# Patient Record
Sex: MALE | Race: WHITE
[De-identification: names, ages, dates, MRNs, and addresses within clinical notes are randomized per-mention and may not be internally consistent; named-entity substitution may affect disease eponyms.]

---

## 2018-11-21 ENCOUNTER — HOSPITAL ENCOUNTER (OUTPATIENT)
Dept: HOSPITAL 88 - CT | Age: 53
End: 2018-11-21
Attending: FAMILY MEDICINE
Payer: MEDICARE

## 2018-11-21 DIAGNOSIS — J18.9: Primary | ICD-10-CM

## 2018-11-21 PROCEDURE — 71250 CT THORAX DX C-: CPT

## 2018-11-21 NOTE — DIAGNOSTIC IMAGING REPORT
EXAMINATION: CT scan of the chest  without contrast.



TECHNIQUE: Helical CT images of the chest were performed from the lung apices

to the level of the adrenal glands.  No intravenous contrast was administered  

  Coronal and sagittal reformatted images were obtained.  Dose modulation,

iterative reconstruction, and/or weight based adjustment of the mA/kV was

utilized to reduce the radiation dose to as low as reasonably achievable. 



COMPARISON: None.



CLINICAL HISTORY:Pneumonia



DISCUSSION:   ABSENCE OF INTRAVENOUS CONTRAST DECREASES SENSITIVITY FOR

DETECTION OF FOCAL LESIONS AND VASCULAR PATHOLOGY.



LINES/TUBES:  None.



LUNGS AND AIRWAYS: Right upper lobe is elongated consolidation/scarring for

example on axial image 37 through 46 has decreased since the prior exam.



Consolidation along the right major fissure axial image 58 measures 1.9 cm,

decreased from 2.9 cm. Surrounding groundglass change resolved.



PLEURA: No pneumothorax or pleural effusions.  



HEART AND MEDIASTINUM: The thyroid gland is normal.   The heart and pericardium

are within normal limits.



LYMPH NODES: There is no mediastinal, hilar or axillary lymphadenopathy.



ABDOMEN: Limited contrast-enhanced views of the upper abdomen show no

abnormality within the visualized liver, spleen, pancreas, or kidneys. The

adrenal glands are normal.



BONES AND SOFT TISSUES: No acute bony abnormalities.



IMPRESSION: 



Decreased right upper and middle lobe consolidations likely reflects resolving

pneumonia.



Signed by: Dr. Andrew Palisch, M.D. on 11/21/2018 2:36 PM

## 2019-01-23 ENCOUNTER — HOSPITAL ENCOUNTER (OUTPATIENT)
Dept: HOSPITAL 88 - US | Age: 54
End: 2019-01-23
Attending: FAMILY MEDICINE
Payer: MEDICARE

## 2019-01-23 DIAGNOSIS — J18.9: Primary | ICD-10-CM

## 2019-01-23 DIAGNOSIS — R10.11: ICD-10-CM

## 2019-01-23 LAB
BUN SERPL-MCNC: 24 MG/DL (ref 7–26)
BUN/CREAT SERPL: 29 (ref 6–25)
EGFRCR SERPLBLD CKD-EPI 2021: > 60 ML/MIN (ref 60–?)

## 2019-01-23 PROCEDURE — 82565 ASSAY OF CREATININE: CPT

## 2019-01-23 PROCEDURE — 76700 US EXAM ABDOM COMPLETE: CPT

## 2019-01-23 PROCEDURE — 84520 ASSAY OF UREA NITROGEN: CPT

## 2019-01-23 PROCEDURE — 71260 CT THORAX DX C+: CPT

## 2019-01-23 PROCEDURE — 36415 COLL VENOUS BLD VENIPUNCTURE: CPT

## 2019-01-23 NOTE — DIAGNOSTIC IMAGING REPORT
EXAM: ABDOMINAL ULTRASOUND



Date: 1/23/2019 10:25 AM



Indication:     



Comparison: None



Technique: Sonographic evaluation of the abdomen.  Color doppler was utilized

to supplement evaluation.



FINDINGS:



LIVER:  

No focal lesion is identified.  The liver measures 15.8 cm in the right

midclavicular line.  Echotexture is increased.



BILIARY:  

The gallbladder has a normal appearance, without evidence for gallstones,

gallbladder wall thickening or pericholecystic fluid.  The common bile duct

measures 0.3 cm.    



PANCREAS: 

The pancreas is incompletely visualized due to overlying bowel gas, but no

abnormality identified involving the visualized portions of the pancreas.     



KIDNEYS:

     Right:  Measures 11.1 cm in length.

     Left:    Measures 10.3 cm in length. 

     Other:  No hydronephrosis or solid mass lesion identified.    

       

SPLEEN:  

No splenomegaly.



PERITONEUM:  

No free fluid.



VASCULATURE:  

     Aorta:  Visualized portions appear unremarkable.

     Interior vena cava:  Visualized portions appear unremarkable.

     Portal Vein:  Nondilated with hepatopedal flow.





IMPRESSION: 

Unremarkable abdominal ultrasound.



Questionable mild hepatic steatosis.









Signed by: Dr. Rafa Ross MD on 1/23/2019 11:51 AM

## 2019-01-23 NOTE — DIAGNOSTIC IMAGING REPORT
EXAM: CT Chest WITH contrast  



INDICATION: Pneumonia  



COMPARISON: 11/2/2018 and 10/12/2018 CT, no report available



TECHNIQUE:  The Chest was scanned utilizing a multidetector helical scanner

after administration of IV contrast. Coronal and sagittal reformations were

obtained.        



     IV CONTRAST: 100 mL Isovue-[id number]

             

     COMPLICATIONS: None



RADIATION DOSE:

     Total DLP: 565 mGy*cm

     Estimated effective dose: (DLP x 0.015 x size factor) mSv

     CTDIvol has been reviewed. It is below the limits set by the Radiation

Protocol Committee (RPC). Appropriate CT dose reduction techniques were

utilized.



FINDINGS:  



Lines and Tubes: None.



Lower Neck: The visualized thyroid gland is grossly unremarkable with no

suspicious or significant nodule identified.



Heart and Great Vessels: The aorta and main pulmonary artery measure 37 and 30

mm. respectively.  No pericardial effusion. Mild vascular calcifications mid

LAD. 



Lymph Nodes: Small scattered mediastinal and hilar lymph nodes similar in

size/appearance. While not distinctly enlarged by size criteria, increased in

number. Partially calcified lymph nodes left hilar region.



Lungs:  Mild biapical scarring. There is no pneumothorax or pleural effusion.

Trachea and central bronchi are unremarkable.



Minimal centrilobular emphysematous changes are present. Linear and nodular

density in the right upper lobe, as demonstrated series 3 image 51 is again

noted, with previous 11 x 5 mm nodular area less conspicuous/nodular. 7 mm

triangular density along fissure series 3 image 60 stable, suggesting fissural

lymph node. Previous 21 mm irregular nodular density right middle lobe has

nearly completely resolved with 6 mm nodule remaining, series 3 image 66.



Upper abdomen:  No acute findings.



Bones and Soft Tissues: No acute findings. 



IMPRESSION: 

1.  Resolving nodular densities on the right as above statistically represent

resolving infectious/inflammatory process. Remaining minimal nodularity as

above. Correlation with history for any personal history of malignancy

recommended, however.



2.  Minimal emphysematous changes.



Signed by: Dr. Rafa Ross MD on 1/23/2019 12:07 PM

## 2019-11-04 ENCOUNTER — HOSPITAL ENCOUNTER (OUTPATIENT)
Dept: HOSPITAL 88 - RAD | Age: 54
End: 2019-11-04
Attending: FAMILY MEDICINE
Payer: MEDICARE

## 2019-11-04 DIAGNOSIS — J20.9: Primary | ICD-10-CM

## 2019-11-04 PROCEDURE — 71046 X-RAY EXAM CHEST 2 VIEWS: CPT

## 2019-11-04 NOTE — DIAGNOSTIC IMAGING REPORT
EXAMINATION:  CHEST 2 VIEWS    



INDICATION: Bronchitis



COMPARISON: None

     

FINDINGS:



LINES/TUBES:None



LUNGS:The lungs are well-inflated. No focal consolidation or pulmonary edema.

Small pulmonary nodules seen on chest CT of 1/23/2019 are not apparent on this

radiograph.



PLEURA:No pleural effusion or pneumothorax.



MEDIASTINUM:The cardiomediastinal silhouette appears normal in size and shape.



BONES/SOFT TISSUES:No acute osseous injury.



ABDOMEN:No free air under the diaphragm.





IMPRESSION: 

No focal pneumonia or pulmonary edema.



Signed by: Og Still MD on 11/4/2019 2:19 PM